# Patient Record
Sex: MALE | Race: AMERICAN INDIAN OR ALASKA NATIVE
[De-identification: names, ages, dates, MRNs, and addresses within clinical notes are randomized per-mention and may not be internally consistent; named-entity substitution may affect disease eponyms.]

---

## 2018-05-19 ENCOUNTER — HOSPITAL ENCOUNTER (EMERGENCY)
Dept: HOSPITAL 14 - H.ER | Age: 53
Discharge: HOME | End: 2018-05-19
Payer: SELF-PAY

## 2018-05-19 VITALS — SYSTOLIC BLOOD PRESSURE: 141 MMHG | TEMPERATURE: 98.1 F | HEART RATE: 91 BPM | DIASTOLIC BLOOD PRESSURE: 80 MMHG

## 2018-05-19 VITALS — OXYGEN SATURATION: 97 %

## 2018-05-19 VITALS — RESPIRATION RATE: 18 BRPM

## 2018-05-19 DIAGNOSIS — F31.9: ICD-10-CM

## 2018-05-19 DIAGNOSIS — F11.10: ICD-10-CM

## 2018-05-19 DIAGNOSIS — F10.10: Primary | ICD-10-CM

## 2018-05-19 LAB
BASOPHILS # BLD AUTO: 0 K/UL (ref 0–0.2)
BASOPHILS NFR BLD: 0.4 % (ref 0–2)
BUN SERPL-MCNC: 13 MG/DL (ref 9–20)
CALCIUM SERPL-MCNC: 8.7 MG/DL (ref 8.4–10.2)
EOSINOPHIL # BLD AUTO: 0.1 K/UL (ref 0–0.7)
EOSINOPHIL NFR BLD: 1 % (ref 0–4)
ERYTHROCYTE [DISTWIDTH] IN BLOOD BY AUTOMATED COUNT: 14.1 % (ref 11.5–14.5)
GFR NON-AFRICAN AMERICAN: 53
HGB BLD-MCNC: 13.3 G/DL (ref 12–18)
LYMPHOCYTES # BLD AUTO: 3.5 K/UL (ref 1–4.3)
LYMPHOCYTES NFR BLD AUTO: 45.9 % (ref 20–40)
MCH RBC QN AUTO: 32.9 PG (ref 27–31)
MCHC RBC AUTO-ENTMCNC: 35.5 G/DL (ref 33–37)
MCV RBC AUTO: 92.6 FL (ref 80–94)
MONOCYTES # BLD: 0.7 K/UL (ref 0–0.8)
MONOCYTES NFR BLD: 8.9 % (ref 0–10)
NEUTROPHILS # BLD: 3.4 K/UL (ref 1.8–7)
NEUTROPHILS NFR BLD AUTO: 43.8 % (ref 50–75)
NRBC BLD AUTO-RTO: 0.1 % (ref 0–0)
PLATELET # BLD: 275 K/UL (ref 130–400)
PMV BLD AUTO: 6.8 FL (ref 7.2–11.7)
RBC # BLD AUTO: 4.06 MIL/UL (ref 4.4–5.9)
WBC # BLD AUTO: 7.7 K/UL (ref 4.8–10.8)

## 2018-05-19 PROCEDURE — 80048 BASIC METABOLIC PNL TOTAL CA: CPT

## 2018-05-19 PROCEDURE — 99285 EMERGENCY DEPT VISIT HI MDM: CPT

## 2018-05-19 PROCEDURE — 93005 ELECTROCARDIOGRAM TRACING: CPT

## 2018-05-19 PROCEDURE — 96360 HYDRATION IV INFUSION INIT: CPT

## 2018-05-19 PROCEDURE — 84484 ASSAY OF TROPONIN QUANT: CPT

## 2018-05-19 PROCEDURE — 85025 COMPLETE CBC W/AUTO DIFF WBC: CPT

## 2018-05-19 NOTE — ED PDOC
HPI: Psych/Substance Abuse


Time Seen by Provider: 05/19/18 12:33


Chief Complaint (Nursing): Substance Abuse


Chief Complaint (Provider): Heroine abuse


History Per: Patient, EMS, Family


History/Exam Limitations: no limitations


Onset/Duration Of Symptoms: Days (today)


Additional Complaint(s): 





Pt. states he did heroine, 3 bags.  No etoh.  No other drugs.  Pt. denies any 

chest pain, dyspnea, weakness, headaches, fall, pain.  No vision changes.  Pt. 

found outside not responding so EMS called.  EMS gave narcan intranasal and pt. 

responding and communicating after.   





Past Medical History


Reviewed: Nursing Documentation, Vital Signs


Vital Signs: 





 Last Vital Signs











Temp  98 F   05/19/18 12:31


 


Pulse  89   05/19/18 12:45


 


Resp  18   05/19/18 12:45


 


BP  148/99 H  05/19/18 12:31


 


Pulse Ox  97   05/19/18 12:48














- Medical History


PMH: Bipolar Disorder





- Family History


Family History: States: Unknown Family Hx





- Living Arrangements


Living Arrangements: With Family





- Social History


Alcohol: Occasional


Drugs: Opiates





- Home Medications


Home Medications: 


 Ambulatory Orders











 Medication  Instructions  Recorded


 


Naproxen 500 mg PO BID #20 tablet 03/23/16


 


Oxycodone HCl/Acetaminophen 1 tab PO Q6 PRN #12 tab 03/23/16





[Percocet 325 mg-5 mg]  


 


Naloxone HCl [Narcan] 4 mg NS ONCE #5 spray 02/08/17














- Allergies


Allergies/Adverse Reactions: 


 Allergies











Allergy/AdvReac Type Severity Reaction Status Date / Time


 


No Known Allergies Allergy   Verified 03/23/16 10:20














Review of Systems


ROS Statement: Except As Marked, All Systems Reviewed And Found Negative





Physical Exam





- Reviewed


Nursing Documentation Reviewed: Yes


Vital Signs Reviewed: Yes





- Physical Exam


Appears: Positive for: Non-toxic, No Acute Distress


Head Exam: Positive for: ATRAUMATIC, NORMAL INSPECTION, NORMOCEPHALIC


Skin: Positive for: Normal Color, Warm, DRY


Eye Exam: Positive for: EOMI, Normal appearance, PERRL


ENT: Positive for: Normal ENT Inspection


Neck: Positive for: Normal, Painless ROM


Cardiovascular/Chest: Positive for: Regular Rate, Rhythm


Respiratory: Positive for: CNT, Normal Breath Sounds


Gastrointestinal/Abdominal: Positive for: Normal Exam, Soft.  Negative for: 

Tenderness


Back: Positive for: Normal Inspection.  Negative for: L CVA Tenderness, R CVA 

Tenderness


Extremity: Positive for: Normal ROM.  Negative for: Tenderness, Pedal Edema


Neurologic/Psych: Positive for: Alert, CNs II-XII, Oriented.  Negative for: 

Motor/Sensory Deficits





- Laboratory Results


Result Diagrams: 


 05/19/18 13:23





 05/19/18 13:23


Interpretation Of Abn Labs: 346 etoh





- ECG


O2 Sat by Pulse Oximetry: 97


Pulse Ox Interpretation: Normal





- Progress


ED Course And Treament: 





1434:  Pt. told nurse and family he did the drugs as he is suicidal.  Denies 

any other meds.  





1455:  Stable.  AAOx3.  Dr. Serrano to take over care.  Etoh elevated.  

Pending drug screen.  Medical clearance pending.  





Disposition





- Clinical Impression


Clinical Impression: 


 Alcohol abuse, Heroin abuse








- Patient ED Disposition


Is Patient to be Admitted: Yes


Counseled Patient/Family Regarding: Studies Performed, Diagnosis





- Disposition


Disposition: Transfer of Care


Disposition Time: 14:15


Condition: FAIR


Patient Signed Over To: Sherry Serrano

## 2018-05-19 NOTE — ED PDOC
- Laboratory Results


Result Diagrams: 


 05/19/18 13:23





 05/19/18 13:23





- ECG


O2 Sat by Pulse Oximetry: 97





Medical Decision Making


Medical Decision Making: 


Time: 1515


-- Patient signed to me by Dr. Gallagher. 


-- Patient is status post narcan for heroin overdose and ETOH intoxication. 

Pending ER workup, medical clearance, and crisis evaluation for an . 


-- Patient is awake, conversing with family, on 1:1 observation, and is no 

acute distress.


-- Crisis made aware.





1900


Pt awake and alert. Reporting he is not suicidal. No signs of withdrawal or 

intoxication at thist times. Oriented x 3.


Evaluated by MELIDA Baker who d/w psychiatrist. Pt psychiatrically clear for 

discharge.


_________________________________________________________


Scribe Attestation:


Documented by Saima Martinez, acting as a scribe for Dr. Sherry Serrano.





Provider Scribe Attestation:


All medical record entries made by the Scribe were at my direction and 

personally dictated by me. I have reviewed the chart and agree that the record 

accurately reflects my personal performance of the history, physical exam, 

medical decision making, and the department course for this patient. I have 

also personally directed, reviewed, and agree with the discharge instructions 

and disposition.








Disposition





- Clinical Impression


Clinical Impression: 


 Alcohol abuse, Heroin abuse, Alcohol-induced mood disorder








- POA


Present On Arrival: None





- Disposition


Referrals: 


[facility] [Outside]


[facility] [Outside]


Disposition: Routine/Home


Disposition Time: 18:45


Condition: IMPROVED


Instructions:  Drug Abuse and Drug Addiction (DC), Alcohol Abuse and Alcoholism 

(DC)

## 2018-05-21 NOTE — CARD
--------------- APPROVED REPORT --------------





EKG Measurement

Heart [id number]

TN [id number]

[id number]

[id number]

[id number]



<Conclusion>

Sinus tachycardia

Nonspecific T wave abnormality

Abnormal ECG

## 2018-06-16 ENCOUNTER — HOSPITAL ENCOUNTER (EMERGENCY)
Dept: HOSPITAL 14 - H.ER | Age: 53
Discharge: HOME | End: 2018-06-16
Payer: COMMERCIAL

## 2018-06-16 VITALS
DIASTOLIC BLOOD PRESSURE: 78 MMHG | TEMPERATURE: 97 F | RESPIRATION RATE: 19 BRPM | HEART RATE: 78 BPM | SYSTOLIC BLOOD PRESSURE: 128 MMHG

## 2018-06-16 VITALS — OXYGEN SATURATION: 99 %
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
## 2018-06-16 DIAGNOSIS — S89.91XA: ICD-10-CM
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
## 2018-06-16 DIAGNOSIS — S09.90XA: ICD-10-CM

## 2018-06-16 DIAGNOSIS — F10.129: Primary | ICD-10-CM

## 2018-06-16 LAB
ALBUMIN SERPL-MCNC: 4.5 G/DL (ref 3.5–5)
ALBUMIN/GLOB SERPL: 1.1 {RATIO} (ref 1–2.1)
ALT SERPL-CCNC: 46 U/L (ref 21–72)
AST SERPL-CCNC: 59 U/L (ref 17–59)
BASOPHILS # BLD AUTO: 0 K/UL (ref 0–0.2)
BASOPHILS NFR BLD: 0.6 % (ref 0–2)
BUN SERPL-MCNC: 17 MG/DL (ref 9–20)
CALCIUM SERPL-MCNC: 9.1 MG/DL (ref 8.4–10.2)
EOSINOPHIL # BLD AUTO: 0.1 K/UL (ref 0–0.7)
EOSINOPHIL NFR BLD: 1.3 % (ref 0–4)
ERYTHROCYTE [DISTWIDTH] IN BLOOD BY AUTOMATED COUNT: 13.9 % (ref 11.5–14.5)
GFR NON-AFRICAN AMERICAN: 58
HGB BLD-MCNC: 14 G/DL (ref 12–18)
LYMPHOCYTES # BLD AUTO: 3 K/UL (ref 1–4.3)
LYMPHOCYTES NFR BLD AUTO: 43.7 % (ref 20–40)
MCH RBC QN AUTO: 31.6 PG (ref 27–31)
MCHC RBC AUTO-ENTMCNC: 33.7 G/DL (ref 33–37)
MCV RBC AUTO: 93.8 FL (ref 80–94)
MONOCYTES # BLD: 0.5 K/UL (ref 0–0.8)
MONOCYTES NFR BLD: 7.6 % (ref 0–10)
NEUTROPHILS # BLD: 3.2 K/UL (ref 1.8–7)
NEUTROPHILS NFR BLD AUTO: 46.8 % (ref 50–75)
NRBC BLD AUTO-RTO: 0.1 % (ref 0–0)
PLATELET # BLD: 236 K/UL (ref 130–400)
PMV BLD AUTO: 7.2 FL (ref 7.2–11.7)
RBC # BLD AUTO: 4.43 MIL/UL (ref 4.4–5.9)
WBC # BLD AUTO: 6.9 K/UL (ref 4.8–10.8)

## 2018-06-16 PROCEDURE — 72125 CT NECK SPINE W/O DYE: CPT

## 2018-06-16 PROCEDURE — 90715 TDAP VACCINE 7 YRS/> IM: CPT

## 2018-06-16 PROCEDURE — 70450 CT HEAD/BRAIN W/O DYE: CPT

## 2018-06-16 PROCEDURE — 80053 COMPREHEN METABOLIC PANEL: CPT

## 2018-06-16 PROCEDURE — 99282 EMERGENCY DEPT VISIT SF MDM: CPT

## 2018-06-16 PROCEDURE — 85025 COMPLETE CBC W/AUTO DIFF WBC: CPT

## 2018-06-16 PROCEDURE — 73562 X-RAY EXAM OF KNEE 3: CPT

## 2018-06-16 PROCEDURE — 90471 IMMUNIZATION ADMIN: CPT

## 2018-06-16 NOTE — CT
PROCEDURE:  CT HEAD WITHOUT CONTRAST.



HISTORY:

trauma



COMPARISON:

None available. 



TECHNIQUE:

Axial computed tomography images were obtained through the head/brain 

without intravenous contrast.  



Radiation dose:



Total exam DLP = 2458 mGy-cm.



This CT exam was performed using one or more of the following dose 

reduction techniques: Automated exposure control, adjustment of the 

mA and/or kV according to patient size, and/or use of iterative 

reconstruction technique.



FINDINGS:



HEMORRHAGE:

No intracranial hemorrhage. 



BRAIN:

No mass effect or edema.  No atrophy or chronic microvascular 

ischemic changes.



VENTRICLES:

Unremarkable. No hydrocephalus. 



CALVARIUM:

Unremarkable.



PARANASAL SINUSES:

Unremarkable as visualized. No significant inflammatory changes.



MASTOID AIR CELLS:

Unremarkable as visualized. No inflammatory changes.



OTHER FINDINGS:

None.



IMPRESSION:

No acute intracranial pathology.

## 2018-06-16 NOTE — ED PDOC
HPI: Psych/Substance Abuse


Time Seen by Provider: 06/16/18 13:03


Chief Complaint (Nursing): Substance Abuse


History Per: Patient, Family (wife)


History/Exam Limitations: intoxication


Additional Complaint(s): 





As per wife pt. left their home today at approximately 1000 to drink alcohol 

and celebrate his birthday. She was then informed by her neighbors that pt. had 

"passed out." Pt. was found outside. Admits to drinking. 





Past Medical History


Reviewed: Historical Data, Nursing Documentation, Vital Signs


Vital Signs: 





 Last Vital Signs











Temp  97.9 F   06/16/18 12:50


 


Pulse  91 H  06/16/18 12:50


 


Resp  18   06/16/18 12:50


 


BP  122/78   06/16/18 12:50


 


Pulse Ox  99   06/16/18 12:50














- Medical History


PMH: Bipolar Disorder


   Denies: Diabetes, Hepatitis, HIV, HTN, Seizures, Sexually Transmitted Disease





- Family History


Family History: States: Unknown Family Hx





- Home Medications


Home Medications: 


 Ambulatory Orders











 Medication  Instructions  Recorded


 


Naproxen 500 mg PO BID #20 tablet 03/23/16


 


Oxycodone HCl/Acetaminophen 1 tab PO Q6 PRN #12 tab 03/23/16





[Percocet 325 mg-5 mg]  


 


Naloxone HCl [Narcan] 4 mg NS ONCE #5 spray 02/08/17














- Allergies


Allergies/Adverse Reactions: 


 Allergies











Allergy/AdvReac Type Severity Reaction Status Date / Time


 


No Known Allergies Allergy   Verified 03/23/16 10:20














Review of Systems


Review Of Systems: ROS cannot be obtained secondary to pt's inabilty to answer 

questions.





Physical Exam





- Reviewed


Nursing Documentation Reviewed: Yes


Vital Signs Reviewed: Yes





- Physical Exam


Appears: Positive for: Well, Non-toxic, No Acute Distress


Head Exam: Negative for: ATRAUMATIC, NORMAL INSPECTION, NORMOCEPHALIC


Skin: Positive for: Normal Color, Warm, DRY


Eye Exam: Positive for: EOMI, Normal appearance, PERRL


ENT: Positive for: TM Is/Are (no hemotympanum b/l), Other (superficial abrasion 

to R nostril and R upper lip)


Neck: Positive for: Normal, Painless ROM


Cardiovascular/Chest: Positive for: Regular Rate, Rhythm, Chest Non Tender


Respiratory: Positive for: CNT, Normal Breath Sounds


Pulses-Dorsalis Pedis (L): 2+


Pulses-Dorsalis Pedis (R): 2+


Gastrointestinal/Abdominal: Positive for: Normal Exam, Soft, Other (no 

ecchymosis).  Negative for: Tenderness


Back: Positive for: Normal Inspection


Extremity: Positive for: Normal ROM, Other (superficial abrasion noted to R 

knee without tenderness or deformity; FROM actively of R knee)


Neurologic/Psych: Positive for: Alert, Oriented.  Negative for: Aphasia, Facial 

Droop





- Laboratory Results


Result Diagrams: 


 06/16/18 13:43





 06/16/18 13:43





- ECG


O2 Sat by Pulse Oximetry: 99





- Progress


ED Course And Treament: 





Labs, CT head/cervical spine w/o contrast, R knee x-ray, adacel IM ordered.





1600


On re-evaluation, pt. sleeping comfortably. 


Pt.'s wife at bedside and informed of results. 





1720


Pt. AOx3. Gait steady unassisted. Requesting to be discharged.


Pt. will be discharged under wife's care who is still in ED.


Informed of results. Offers no complaints. 





Disposition





- Clinical Impression


Clinical Impression: 


 Alcohol intoxication, Head injury, Knee injury








- Patient ED Disposition


Is Patient to be Admitted: No





- Disposition


Referrals: 


[facility] [Outside]


Disposition: Routine/Home


Disposition Time: 17:20


Condition: IMPROVED


Additional Instructions: 


Follow up with PMD for further evaluation.


Return to ED immediately for any concerns.  


Instructions:  Alcohol Use - When Is Drinking a Problem?, Closed Head Injury (DC

), Skin Abrasions (DC)


Forms:  [facility] (English)

## 2018-06-16 NOTE — CT
PROCEDURE:  CT Cervical Spine without contrast



HISTORY:

trauma



COMPARISON:

None available.



TECHNIQUE:

Axial computed tomography images were obtained of the cervical spine 

without the use of intravenous contrast. Coronal and sagittal 

reformatted images were created and reviewed.



Radiation dose: 



Total exam DLP = 543.1 mGy-cm.



This CT exam was performed using one or more of the following dose 

reduction techniques: Automated exposure control, adjustment of the 

mA and/or kV according to patient size, and/or use of iterative 

reconstruction technique.



FINDINGS:



VERTEBRAE:

No fracture. Normal alignment. No destructive bony lesion. Multilevel 

endplate changes.



DISCS/SPINAL CANAL/NEURAL FORAMINA:

Multilevel disc space narrowing with complete loss of disc space at 

C5-6.



PARASPINAL SOFT TISSUES:

Unremarkable. 



OTHER FINDINGS:

None.



IMPRESSION:

No acute fracture.  Multilevel degenerative changes.

## 2018-06-16 NOTE — RAD
PROCEDURE:  Right Knee Radiographs.



HISTORY:

trauma



COMPARISON:

None.



FINDINGS:



BONES:

No acute fracture. 



JOINTS:

Unremarkable. 



JOINT EFFUSION:

None. 



OTHER FINDINGS:

None.



IMPRESSION:

No demonstrated fracture or dislocation.